# Patient Record
Sex: MALE | Race: WHITE | HISPANIC OR LATINO | URBAN - METROPOLITAN AREA
[De-identification: names, ages, dates, MRNs, and addresses within clinical notes are randomized per-mention and may not be internally consistent; named-entity substitution may affect disease eponyms.]

---

## 2022-11-09 ENCOUNTER — OFFICE (AMBULATORY)
Dept: URBAN - METROPOLITAN AREA CLINIC 76 | Facility: CLINIC | Age: 42
End: 2022-11-09
Payer: OTHER GOVERNMENT

## 2022-11-09 VITALS
HEART RATE: 61 BPM | HEIGHT: 69 IN | OXYGEN SATURATION: 99 % | SYSTOLIC BLOOD PRESSURE: 115 MMHG | DIASTOLIC BLOOD PRESSURE: 70 MMHG | WEIGHT: 220 LBS

## 2022-11-09 DIAGNOSIS — K92.1 MELENA: ICD-10-CM

## 2022-11-09 DIAGNOSIS — K64.2 THIRD DEGREE HEMORRHOIDS: ICD-10-CM

## 2022-11-09 PROCEDURE — 99204 OFFICE O/P NEW MOD 45 MIN: CPT | Performed by: INTERNAL MEDICINE

## 2022-11-23 ENCOUNTER — OFFICE (AMBULATORY)
Dept: URBAN - METROPOLITAN AREA CLINIC 76 | Facility: CLINIC | Age: 42
End: 2022-11-23
Payer: OTHER GOVERNMENT

## 2022-11-23 VITALS — HEIGHT: 69 IN

## 2022-11-23 DIAGNOSIS — K64.2 THIRD DEGREE HEMORRHOIDS: ICD-10-CM

## 2022-11-23 PROCEDURE — 46221 LIGATION OF HEMORRHOID(S): CPT | Performed by: INTERNAL MEDICINE

## 2022-11-23 NOTE — SERVICEHPINOTES
Mr. Reyes is a 42M Vázquez for dilation of internal hemorrhoids. He states that last year he began have rectal bleeding will he was living in [place]. He states was having was daily, he had a colonoscopy that was unremarkable other than internal hemorrhoids. The told to start taking fiber. He did take some Metamucil but he developed some bloating, he then began taking Benefiber which helped remarkably well. He is now based at [place], the bleeding seems to have returned. He continues to take Benefiber but still feels like he is straining at times. Occasionally depending on what he eats for dinner he will have some gas in the morning he has no upper GI symptoms. No nausea, no vomiting. No GERD, no dysphagia.No prior EGD.He is interested in hemorrhoid banding.He has no other major medical problems. He takes no blood thinners.

## 2023-01-04 ENCOUNTER — OFFICE (AMBULATORY)
Dept: URBAN - METROPOLITAN AREA CLINIC 76 | Facility: CLINIC | Age: 43
End: 2023-01-04
Payer: OTHER GOVERNMENT

## 2023-01-04 VITALS — HEIGHT: 69 IN

## 2023-01-04 DIAGNOSIS — K64.2 THIRD DEGREE HEMORRHOIDS: ICD-10-CM

## 2023-01-04 PROCEDURE — 46221 LIGATION OF HEMORRHOID(S): CPT | Performed by: INTERNAL MEDICINE

## 2023-01-04 NOTE — SERVICEHPINOTES
Mr. Reyes is a 42M Vázquez for dilation of internal hemorrhoids. He states that last year he began have rectal bleeding will he was living in [place]. He states was having was daily, he had a colonoscopy that was unremarkable other than internal hemorrhoids. The told to start taking fiber. He did take some Metamucil but he developed some bloating, he then began taking Benefiber which helped remarkably well. He is now based at [place], the bleeding seems to have returned. He continues to take Benefiber but still feels like he is straining at times. Occasionally depending on what he eats for dinner he will have some gas in the morning he has no upper GI symptoms. No nausea, no vomiting. No GERD, no dysphagia.No prior EGD.He is interested in hemorrhoid banding.He has no other major medical problems. He takes no blood thinners.

## 2023-01-11 ENCOUNTER — OFFICE (AMBULATORY)
Dept: URBAN - METROPOLITAN AREA CLINIC 76 | Facility: CLINIC | Age: 43
End: 2023-01-11
Payer: OTHER GOVERNMENT

## 2023-01-11 VITALS — HEIGHT: 69 IN

## 2023-01-11 DIAGNOSIS — K64.2 THIRD DEGREE HEMORRHOIDS: ICD-10-CM

## 2023-01-11 PROCEDURE — 46221 LIGATION OF HEMORRHOID(S): CPT | Performed by: INTERNAL MEDICINE

## 2023-04-13 ENCOUNTER — OFFICE (AMBULATORY)
Dept: URBAN - METROPOLITAN AREA CLINIC 76 | Facility: CLINIC | Age: 43
End: 2023-04-13
Payer: OTHER GOVERNMENT

## 2023-04-13 VITALS
HEIGHT: 69 IN | WEIGHT: 222 LBS | DIASTOLIC BLOOD PRESSURE: 85 MMHG | SYSTOLIC BLOOD PRESSURE: 127 MMHG | HEART RATE: 61 BPM

## 2023-04-13 DIAGNOSIS — K64.2 THIRD DEGREE HEMORRHOIDS: ICD-10-CM

## 2023-04-13 DIAGNOSIS — K62.5 HEMORRHAGE OF ANUS AND RECTUM: ICD-10-CM

## 2023-04-13 PROCEDURE — 99213 OFFICE O/P EST LOW 20 MIN: CPT | Performed by: INTERNAL MEDICINE

## 2024-04-11 VITALS
HEIGHT: 69 IN | DIASTOLIC BLOOD PRESSURE: 79 MMHG | WEIGHT: 224 LBS | SYSTOLIC BLOOD PRESSURE: 131 MMHG | RESPIRATION RATE: 16 BRPM | HEART RATE: 59 BPM

## 2024-04-12 ENCOUNTER — OFFICE (AMBULATORY)
Dept: URBAN - METROPOLITAN AREA CLINIC 76 | Facility: CLINIC | Age: 44
End: 2024-04-12
Payer: OTHER GOVERNMENT

## 2024-04-12 DIAGNOSIS — K92.1 MELENA: ICD-10-CM

## 2024-04-12 DIAGNOSIS — K64.2 THIRD DEGREE HEMORRHOIDS: ICD-10-CM

## 2024-04-12 PROCEDURE — 99214 OFFICE O/P EST MOD 30 MIN: CPT

## 2024-04-12 RX ORDER — HYDROCORTISONE ACETATE 25 MG/1
25 SUPPOSITORY RECTAL
Qty: 21 | Refills: 1 | Status: ACTIVE
Start: 2024-04-12

## 2024-07-09 ENCOUNTER — OFFICE (AMBULATORY)
Dept: URBAN - METROPOLITAN AREA CLINIC 76 | Facility: CLINIC | Age: 44
End: 2024-07-09
Payer: OTHER GOVERNMENT

## 2024-07-09 VITALS
HEART RATE: 66 BPM | WEIGHT: 223 LBS | HEIGHT: 69 IN | DIASTOLIC BLOOD PRESSURE: 76 MMHG | SYSTOLIC BLOOD PRESSURE: 128 MMHG

## 2024-07-09 DIAGNOSIS — R53.83 OTHER FATIGUE: ICD-10-CM

## 2024-07-09 DIAGNOSIS — K64.2 THIRD DEGREE HEMORRHOIDS: ICD-10-CM

## 2024-07-09 DIAGNOSIS — K92.1 MELENA: ICD-10-CM

## 2024-07-09 DIAGNOSIS — R14.0 ABDOMINAL DISTENSION (GASEOUS): ICD-10-CM

## 2024-07-09 PROCEDURE — 99214 OFFICE O/P EST MOD 30 MIN: CPT

## 2024-08-08 VITALS
RESPIRATION RATE: 17 BRPM | HEART RATE: 60 BPM | HEART RATE: 64 BPM | HEART RATE: 61 BPM | RESPIRATION RATE: 13 BRPM | DIASTOLIC BLOOD PRESSURE: 86 MMHG | HEART RATE: 58 BPM | WEIGHT: 223 LBS | HEART RATE: 73 BPM | DIASTOLIC BLOOD PRESSURE: 71 MMHG | RESPIRATION RATE: 20 BRPM | DIASTOLIC BLOOD PRESSURE: 69 MMHG | OXYGEN SATURATION: 96 % | RESPIRATION RATE: 11 BRPM | RESPIRATION RATE: 15 BRPM | DIASTOLIC BLOOD PRESSURE: 68 MMHG | DIASTOLIC BLOOD PRESSURE: 52 MMHG | SYSTOLIC BLOOD PRESSURE: 104 MMHG | TEMPERATURE: 97.2 F | OXYGEN SATURATION: 98 % | OXYGEN SATURATION: 100 % | DIASTOLIC BLOOD PRESSURE: 53 MMHG | SYSTOLIC BLOOD PRESSURE: 124 MMHG | RESPIRATION RATE: 22 BRPM | DIASTOLIC BLOOD PRESSURE: 70 MMHG | HEART RATE: 52 BPM | SYSTOLIC BLOOD PRESSURE: 141 MMHG | HEART RATE: 72 BPM | HEART RATE: 54 BPM | DIASTOLIC BLOOD PRESSURE: 56 MMHG | RESPIRATION RATE: 16 BRPM | SYSTOLIC BLOOD PRESSURE: 96 MMHG | HEIGHT: 69 IN | TEMPERATURE: 97.1 F | SYSTOLIC BLOOD PRESSURE: 101 MMHG | SYSTOLIC BLOOD PRESSURE: 107 MMHG | SYSTOLIC BLOOD PRESSURE: 111 MMHG

## 2024-08-12 ENCOUNTER — OFFICE (AMBULATORY)
Dept: URBAN - METROPOLITAN AREA PATHOLOGY 4 | Facility: PATHOLOGY | Age: 44
End: 2024-08-12
Payer: OTHER GOVERNMENT

## 2024-08-12 ENCOUNTER — AMBULATORY SURGICAL CENTER (AMBULATORY)
Dept: URBAN - METROPOLITAN AREA SURGERY 17 | Facility: SURGERY | Age: 44
End: 2024-08-12
Payer: OTHER GOVERNMENT

## 2024-08-12 DIAGNOSIS — K92.1 MELENA: ICD-10-CM

## 2024-08-12 DIAGNOSIS — D12.3 BENIGN NEOPLASM OF TRANSVERSE COLON: ICD-10-CM

## 2024-08-12 PROBLEM — K63.5 POLYP OF COLON: Status: ACTIVE | Noted: 2024-08-12

## 2024-08-12 LAB
GI HISTOLOGY: A. TRANSVERSE COLON: (no result)
GI HISTOLOGY: PDF REPORT: (no result)

## 2024-08-12 PROCEDURE — 88305 TISSUE EXAM BY PATHOLOGIST: CPT | Performed by: PATHOLOGY

## 2024-08-12 PROCEDURE — 45385 COLONOSCOPY W/LESION REMOVAL: CPT | Performed by: INTERNAL MEDICINE

## 2024-09-20 ENCOUNTER — OFFICE (AMBULATORY)
Dept: URBAN - METROPOLITAN AREA CLINIC 76 | Facility: CLINIC | Age: 44
End: 2024-09-20
Payer: OTHER GOVERNMENT

## 2024-09-20 ENCOUNTER — OFFICE (AMBULATORY)
Age: 44
End: 2024-09-20
Payer: OTHER GOVERNMENT

## 2024-09-20 VITALS
OXYGEN SATURATION: 97 % | OXYGEN SATURATION: 97 % | OXYGEN SATURATION: 97 % | SYSTOLIC BLOOD PRESSURE: 136 MMHG | HEART RATE: 65 BPM | SYSTOLIC BLOOD PRESSURE: 136 MMHG | DIASTOLIC BLOOD PRESSURE: 70 MMHG | SYSTOLIC BLOOD PRESSURE: 136 MMHG | HEART RATE: 65 BPM | HEART RATE: 65 BPM | WEIGHT: 227 LBS | DIASTOLIC BLOOD PRESSURE: 70 MMHG | WEIGHT: 227 LBS | DIASTOLIC BLOOD PRESSURE: 70 MMHG | DIASTOLIC BLOOD PRESSURE: 70 MMHG | HEIGHT: 69 IN | SYSTOLIC BLOOD PRESSURE: 136 MMHG | HEART RATE: 65 BPM | SYSTOLIC BLOOD PRESSURE: 136 MMHG | WEIGHT: 227 LBS | DIASTOLIC BLOOD PRESSURE: 70 MMHG | HEIGHT: 69 IN | HEIGHT: 69 IN | OXYGEN SATURATION: 97 % | HEART RATE: 65 BPM | HEART RATE: 65 BPM | HEIGHT: 69 IN | DIASTOLIC BLOOD PRESSURE: 70 MMHG | WEIGHT: 227 LBS | OXYGEN SATURATION: 97 % | SYSTOLIC BLOOD PRESSURE: 136 MMHG | WEIGHT: 227 LBS | OXYGEN SATURATION: 97 % | HEART RATE: 65 BPM | HEIGHT: 69 IN | SYSTOLIC BLOOD PRESSURE: 136 MMHG | OXYGEN SATURATION: 97 % | HEIGHT: 69 IN | HEIGHT: 69 IN | WEIGHT: 227 LBS | WEIGHT: 227 LBS | DIASTOLIC BLOOD PRESSURE: 70 MMHG

## 2024-09-20 DIAGNOSIS — D64.9 ANEMIA, UNSPECIFIED: ICD-10-CM

## 2024-09-20 DIAGNOSIS — K64.2 THIRD DEGREE HEMORRHOIDS: ICD-10-CM

## 2024-09-20 PROBLEM — K63.5 POLYP OF COLON: Status: ACTIVE | Noted: 2024-08-12

## 2024-09-20 PROCEDURE — 99214 OFFICE O/P EST MOD 30 MIN: CPT

## 2024-09-20 RX ORDER — PANTOPRAZOLE SODIUM 40 MG/1
40 TABLET, DELAYED RELEASE ORAL
Qty: 90 | Refills: 3 | Status: ACTIVE
Start: 2024-09-20

## 2024-10-15 VITALS
WEIGHT: 213 LBS | OXYGEN SATURATION: 96 % | DIASTOLIC BLOOD PRESSURE: 61 MMHG | WEIGHT: 213 LBS | DIASTOLIC BLOOD PRESSURE: 75 MMHG | RESPIRATION RATE: 11 BRPM | OXYGEN SATURATION: 96 % | OXYGEN SATURATION: 98 % | SYSTOLIC BLOOD PRESSURE: 121 MMHG | RESPIRATION RATE: 16 BRPM | SYSTOLIC BLOOD PRESSURE: 112 MMHG | SYSTOLIC BLOOD PRESSURE: 112 MMHG | HEIGHT: 69 IN | DIASTOLIC BLOOD PRESSURE: 71 MMHG | TEMPERATURE: 98.1 F | RESPIRATION RATE: 13 BRPM | HEART RATE: 71 BPM | RESPIRATION RATE: 5 BRPM | TEMPERATURE: 97.4 F | SYSTOLIC BLOOD PRESSURE: 121 MMHG | RESPIRATION RATE: 5 BRPM | TEMPERATURE: 98.1 F | TEMPERATURE: 98.1 F | SYSTOLIC BLOOD PRESSURE: 121 MMHG | HEART RATE: 59 BPM | DIASTOLIC BLOOD PRESSURE: 66 MMHG | RESPIRATION RATE: 7 BRPM | TEMPERATURE: 97.4 F | DIASTOLIC BLOOD PRESSURE: 87 MMHG | SYSTOLIC BLOOD PRESSURE: 123 MMHG | SYSTOLIC BLOOD PRESSURE: 101 MMHG | SYSTOLIC BLOOD PRESSURE: 121 MMHG | HEART RATE: 59 BPM | RESPIRATION RATE: 16 BRPM | SYSTOLIC BLOOD PRESSURE: 136 MMHG | SYSTOLIC BLOOD PRESSURE: 136 MMHG | DIASTOLIC BLOOD PRESSURE: 75 MMHG | HEART RATE: 61 BPM | HEART RATE: 59 BPM | DIASTOLIC BLOOD PRESSURE: 61 MMHG | DIASTOLIC BLOOD PRESSURE: 75 MMHG | TEMPERATURE: 98.1 F | SYSTOLIC BLOOD PRESSURE: 101 MMHG | RESPIRATION RATE: 11 BRPM | RESPIRATION RATE: 7 BRPM | HEART RATE: 61 BPM | HEART RATE: 61 BPM | OXYGEN SATURATION: 100 % | SYSTOLIC BLOOD PRESSURE: 112 MMHG | SYSTOLIC BLOOD PRESSURE: 136 MMHG | SYSTOLIC BLOOD PRESSURE: 112 MMHG | DIASTOLIC BLOOD PRESSURE: 99 MMHG | RESPIRATION RATE: 11 BRPM | RESPIRATION RATE: 5 BRPM | RESPIRATION RATE: 7 BRPM | DIASTOLIC BLOOD PRESSURE: 71 MMHG | RESPIRATION RATE: 11 BRPM | RESPIRATION RATE: 17 BRPM | SYSTOLIC BLOOD PRESSURE: 151 MMHG | OXYGEN SATURATION: 97 % | OXYGEN SATURATION: 96 % | DIASTOLIC BLOOD PRESSURE: 66 MMHG | OXYGEN SATURATION: 98 % | HEART RATE: 71 BPM | RESPIRATION RATE: 17 BRPM | RESPIRATION RATE: 17 BRPM | SYSTOLIC BLOOD PRESSURE: 136 MMHG | WEIGHT: 213 LBS | HEART RATE: 64 BPM | DIASTOLIC BLOOD PRESSURE: 75 MMHG | SYSTOLIC BLOOD PRESSURE: 136 MMHG | OXYGEN SATURATION: 97 % | HEIGHT: 69 IN | RESPIRATION RATE: 7 BRPM | HEART RATE: 61 BPM | DIASTOLIC BLOOD PRESSURE: 75 MMHG | HEART RATE: 64 BPM | WEIGHT: 213 LBS | DIASTOLIC BLOOD PRESSURE: 99 MMHG | HEART RATE: 71 BPM | DIASTOLIC BLOOD PRESSURE: 87 MMHG | SYSTOLIC BLOOD PRESSURE: 123 MMHG | HEART RATE: 60 BPM | TEMPERATURE: 97.4 F | HEART RATE: 61 BPM | DIASTOLIC BLOOD PRESSURE: 61 MMHG | RESPIRATION RATE: 16 BRPM | HEART RATE: 60 BPM | DIASTOLIC BLOOD PRESSURE: 71 MMHG | DIASTOLIC BLOOD PRESSURE: 66 MMHG | DIASTOLIC BLOOD PRESSURE: 87 MMHG | RESPIRATION RATE: 5 BRPM | RESPIRATION RATE: 17 BRPM | OXYGEN SATURATION: 97 % | DIASTOLIC BLOOD PRESSURE: 99 MMHG | RESPIRATION RATE: 5 BRPM | TEMPERATURE: 97.4 F | SYSTOLIC BLOOD PRESSURE: 101 MMHG | HEIGHT: 69 IN | HEIGHT: 69 IN | OXYGEN SATURATION: 100 % | SYSTOLIC BLOOD PRESSURE: 136 MMHG | SYSTOLIC BLOOD PRESSURE: 101 MMHG | RESPIRATION RATE: 13 BRPM | OXYGEN SATURATION: 98 % | SYSTOLIC BLOOD PRESSURE: 151 MMHG | RESPIRATION RATE: 11 BRPM | HEART RATE: 64 BPM | RESPIRATION RATE: 7 BRPM | OXYGEN SATURATION: 98 % | OXYGEN SATURATION: 97 % | DIASTOLIC BLOOD PRESSURE: 71 MMHG | HEART RATE: 64 BPM | SYSTOLIC BLOOD PRESSURE: 151 MMHG | SYSTOLIC BLOOD PRESSURE: 123 MMHG | OXYGEN SATURATION: 97 % | TEMPERATURE: 98.1 F | HEART RATE: 60 BPM | HEART RATE: 61 BPM | DIASTOLIC BLOOD PRESSURE: 61 MMHG | HEART RATE: 71 BPM | DIASTOLIC BLOOD PRESSURE: 75 MMHG | RESPIRATION RATE: 13 BRPM | DIASTOLIC BLOOD PRESSURE: 99 MMHG | RESPIRATION RATE: 7 BRPM | HEIGHT: 69 IN | HEART RATE: 59 BPM | HEART RATE: 60 BPM | HEART RATE: 59 BPM | TEMPERATURE: 97.4 F | SYSTOLIC BLOOD PRESSURE: 151 MMHG | DIASTOLIC BLOOD PRESSURE: 99 MMHG | TEMPERATURE: 98.1 F | DIASTOLIC BLOOD PRESSURE: 66 MMHG | DIASTOLIC BLOOD PRESSURE: 61 MMHG | SYSTOLIC BLOOD PRESSURE: 121 MMHG | HEART RATE: 60 BPM | HEART RATE: 71 BPM | DIASTOLIC BLOOD PRESSURE: 99 MMHG | RESPIRATION RATE: 17 BRPM | DIASTOLIC BLOOD PRESSURE: 87 MMHG | RESPIRATION RATE: 5 BRPM | OXYGEN SATURATION: 98 % | RESPIRATION RATE: 11 BRPM | SYSTOLIC BLOOD PRESSURE: 151 MMHG | TEMPERATURE: 97.4 F | DIASTOLIC BLOOD PRESSURE: 66 MMHG | DIASTOLIC BLOOD PRESSURE: 61 MMHG | RESPIRATION RATE: 13 BRPM | WEIGHT: 213 LBS | DIASTOLIC BLOOD PRESSURE: 99 MMHG | RESPIRATION RATE: 5 BRPM | HEART RATE: 71 BPM | RESPIRATION RATE: 13 BRPM | SYSTOLIC BLOOD PRESSURE: 123 MMHG | SYSTOLIC BLOOD PRESSURE: 101 MMHG | RESPIRATION RATE: 16 BRPM | TEMPERATURE: 98.1 F | WEIGHT: 213 LBS | OXYGEN SATURATION: 97 % | HEART RATE: 61 BPM | DIASTOLIC BLOOD PRESSURE: 87 MMHG | OXYGEN SATURATION: 97 % | SYSTOLIC BLOOD PRESSURE: 136 MMHG | RESPIRATION RATE: 7 BRPM | RESPIRATION RATE: 16 BRPM | OXYGEN SATURATION: 96 % | DIASTOLIC BLOOD PRESSURE: 61 MMHG | SYSTOLIC BLOOD PRESSURE: 112 MMHG | SYSTOLIC BLOOD PRESSURE: 112 MMHG | OXYGEN SATURATION: 100 % | SYSTOLIC BLOOD PRESSURE: 151 MMHG | SYSTOLIC BLOOD PRESSURE: 101 MMHG | HEART RATE: 60 BPM | HEART RATE: 59 BPM | HEIGHT: 69 IN | OXYGEN SATURATION: 96 % | HEART RATE: 71 BPM | SYSTOLIC BLOOD PRESSURE: 121 MMHG | DIASTOLIC BLOOD PRESSURE: 87 MMHG | SYSTOLIC BLOOD PRESSURE: 121 MMHG | TEMPERATURE: 97.4 F | HEART RATE: 64 BPM | SYSTOLIC BLOOD PRESSURE: 101 MMHG | WEIGHT: 213 LBS | SYSTOLIC BLOOD PRESSURE: 123 MMHG | OXYGEN SATURATION: 98 % | OXYGEN SATURATION: 100 % | HEIGHT: 69 IN | RESPIRATION RATE: 11 BRPM | HEART RATE: 59 BPM | SYSTOLIC BLOOD PRESSURE: 112 MMHG | RESPIRATION RATE: 16 BRPM | OXYGEN SATURATION: 96 % | SYSTOLIC BLOOD PRESSURE: 151 MMHG | DIASTOLIC BLOOD PRESSURE: 66 MMHG | OXYGEN SATURATION: 100 % | RESPIRATION RATE: 13 BRPM | HEART RATE: 64 BPM | OXYGEN SATURATION: 100 % | RESPIRATION RATE: 17 BRPM | OXYGEN SATURATION: 98 % | SYSTOLIC BLOOD PRESSURE: 123 MMHG | DIASTOLIC BLOOD PRESSURE: 71 MMHG | RESPIRATION RATE: 17 BRPM | DIASTOLIC BLOOD PRESSURE: 71 MMHG | OXYGEN SATURATION: 96 % | OXYGEN SATURATION: 100 % | DIASTOLIC BLOOD PRESSURE: 66 MMHG | RESPIRATION RATE: 16 BRPM | DIASTOLIC BLOOD PRESSURE: 75 MMHG | HEART RATE: 64 BPM | HEART RATE: 60 BPM | DIASTOLIC BLOOD PRESSURE: 71 MMHG | RESPIRATION RATE: 13 BRPM | DIASTOLIC BLOOD PRESSURE: 87 MMHG | SYSTOLIC BLOOD PRESSURE: 123 MMHG

## 2024-10-18 ENCOUNTER — AMBULATORY SURGICAL CENTER (AMBULATORY)
Age: 44
End: 2024-10-18
Payer: OTHER GOVERNMENT

## 2024-10-18 ENCOUNTER — OFFICE (AMBULATORY)
Age: 44
End: 2024-10-18
Payer: OTHER GOVERNMENT

## 2024-10-18 ENCOUNTER — OFFICE (AMBULATORY)
Dept: URBAN - METROPOLITAN AREA PATHOLOGY 4 | Facility: PATHOLOGY | Age: 44
End: 2024-10-18
Payer: OTHER GOVERNMENT

## 2024-10-18 ENCOUNTER — AMBULATORY SURGICAL CENTER (AMBULATORY)
Dept: URBAN - METROPOLITAN AREA SURGERY 17 | Facility: SURGERY | Age: 44
End: 2024-10-18
Payer: OTHER GOVERNMENT

## 2024-10-18 DIAGNOSIS — R53.83 OTHER FATIGUE: ICD-10-CM

## 2024-10-18 DIAGNOSIS — K31.89 OTHER DISEASES OF STOMACH AND DUODENUM: ICD-10-CM

## 2024-10-18 DIAGNOSIS — D64.9 ANEMIA, UNSPECIFIED: ICD-10-CM

## 2024-10-18 DIAGNOSIS — R10.13 EPIGASTRIC PAIN: ICD-10-CM

## 2024-10-18 LAB
GI HISTOLOGY: A. SECOND PART OF THE DUODENUM: (no result)
GI HISTOLOGY: B. DUODENAL BULB: (no result)
GI HISTOLOGY: C. STOMACH ANTRUM: (no result)
GI HISTOLOGY: D. LOWER THIRD OF THE ESOPHAGUS: (no result)
GI HISTOLOGY: E. MIDDLE THIRD OF THE ESOPHAGUS: (no result)
GI HISTOLOGY: F. UPPER THIRD OF THE ESOPHAGUS: (no result)
GI HISTOLOGY: PDF REPORT: (no result)

## 2024-10-18 PROCEDURE — 88305 TISSUE EXAM BY PATHOLOGIST: CPT | Performed by: PATHOLOGY

## 2024-10-18 PROCEDURE — 88342 IMHCHEM/IMCYTCHM 1ST ANTB: CPT | Performed by: PATHOLOGY

## 2024-10-18 PROCEDURE — 43235 EGD DIAGNOSTIC BRUSH WASH: CPT | Performed by: INTERNAL MEDICINE

## 2025-01-18 ENCOUNTER — HOSPITAL ENCOUNTER (EMERGENCY)
Facility: HOSPITAL | Age: 45
Discharge: HOME | End: 2025-01-18
Attending: EMERGENCY MEDICINE
Payer: OTHER GOVERNMENT

## 2025-01-18 VITALS
TEMPERATURE: 98 F | OXYGEN SATURATION: 100 % | DIASTOLIC BLOOD PRESSURE: 88 MMHG | RESPIRATION RATE: 20 BRPM | HEART RATE: 78 BPM | SYSTOLIC BLOOD PRESSURE: 128 MMHG

## 2025-01-18 DIAGNOSIS — K52.9 ACUTE GASTROENTERITIS: Primary | ICD-10-CM

## 2025-01-18 DIAGNOSIS — R55 SYNCOPE, UNSPECIFIED SYNCOPE TYPE: ICD-10-CM

## 2025-01-18 LAB
ALBUMIN SERPL-MCNC: 4 G/DL (ref 3.5–5.2)
ALBUMIN/GLOB SERPL: 1.1 G/DL
ALP SERPL-CCNC: 37 U/L (ref 39–117)
ALT SERPL W P-5'-P-CCNC: 14 U/L (ref 1–41)
ANION GAP SERPL CALCULATED.3IONS-SCNC: 11 MMOL/L (ref 5–15)
AST SERPL-CCNC: 19 U/L (ref 1–40)
B PARAPERT DNA SPEC QL NAA+PROBE: NOT DETECTED
B PERT DNA SPEC QL NAA+PROBE: NOT DETECTED
BASOPHILS # BLD AUTO: 0.02 10*3/MM3 (ref 0–0.2)
BASOPHILS NFR BLD AUTO: 0.2 % (ref 0–1.5)
BILIRUB SERPL-MCNC: 0.5 MG/DL (ref 0–1.2)
BUN SERPL-MCNC: 9 MG/DL (ref 6–20)
BUN/CREAT SERPL: 7.5 (ref 7–25)
C PNEUM DNA NPH QL NAA+NON-PROBE: NOT DETECTED
CALCIUM SPEC-SCNC: 9 MG/DL (ref 8.6–10.5)
CHLORIDE SERPL-SCNC: 101 MMOL/L (ref 98–107)
CO2 SERPL-SCNC: 25 MMOL/L (ref 22–29)
CREAT SERPL-MCNC: 1.2 MG/DL (ref 0.76–1.27)
DEPRECATED RDW RBC AUTO: 40.9 FL (ref 37–54)
EGFRCR SERPLBLD CKD-EPI 2021: 77.4 ML/MIN/1.73
EOSINOPHIL # BLD AUTO: 0.08 10*3/MM3 (ref 0–0.4)
EOSINOPHIL NFR BLD AUTO: 0.9 % (ref 0.3–6.2)
ERYTHROCYTE [DISTWIDTH] IN BLOOD BY AUTOMATED COUNT: 12.7 % (ref 12.3–15.4)
FLUAV SUBTYP SPEC NAA+PROBE: NOT DETECTED
FLUBV RNA ISLT QL NAA+PROBE: NOT DETECTED
GEN 5 1HR TROPONIN T REFLEX: <6 NG/L
GLOBULIN UR ELPH-MCNC: 3.5 GM/DL
GLUCOSE SERPL-MCNC: 143 MG/DL (ref 65–99)
HADV DNA SPEC NAA+PROBE: NOT DETECTED
HCOV 229E RNA SPEC QL NAA+PROBE: NOT DETECTED
HCOV HKU1 RNA SPEC QL NAA+PROBE: NOT DETECTED
HCOV NL63 RNA SPEC QL NAA+PROBE: NOT DETECTED
HCOV OC43 RNA SPEC QL NAA+PROBE: NOT DETECTED
HCT VFR BLD AUTO: 42.3 % (ref 37.5–51)
HGB BLD-MCNC: 14 G/DL (ref 13–17.7)
HMPV RNA NPH QL NAA+NON-PROBE: NOT DETECTED
HOLD SPECIMEN: NORMAL
HOLD SPECIMEN: NORMAL
HPIV1 RNA ISLT QL NAA+PROBE: NOT DETECTED
HPIV2 RNA SPEC QL NAA+PROBE: NOT DETECTED
HPIV3 RNA NPH QL NAA+PROBE: NOT DETECTED
HPIV4 P GENE NPH QL NAA+PROBE: NOT DETECTED
IMM GRANULOCYTES # BLD AUTO: 0.04 10*3/MM3 (ref 0–0.05)
IMM GRANULOCYTES NFR BLD AUTO: 0.5 % (ref 0–0.5)
LYMPHOCYTES # BLD AUTO: 0.48 10*3/MM3 (ref 0.7–3.1)
LYMPHOCYTES NFR BLD AUTO: 5.6 % (ref 19.6–45.3)
M PNEUMO IGG SER IA-ACNC: NOT DETECTED
MAGNESIUM SERPL-MCNC: 1.7 MG/DL (ref 1.6–2.6)
MCH RBC QN AUTO: 29.1 PG (ref 26.6–33)
MCHC RBC AUTO-ENTMCNC: 33.1 G/DL (ref 31.5–35.7)
MCV RBC AUTO: 87.9 FL (ref 79–97)
MONOCYTES # BLD AUTO: 0.48 10*3/MM3 (ref 0.1–0.9)
MONOCYTES NFR BLD AUTO: 5.6 % (ref 5–12)
NEUTROPHILS NFR BLD AUTO: 7.42 10*3/MM3 (ref 1.7–7)
NEUTROPHILS NFR BLD AUTO: 87.2 % (ref 42.7–76)
NRBC BLD AUTO-RTO: 0 /100 WBC (ref 0–0.2)
PLATELET # BLD AUTO: 260 10*3/MM3 (ref 140–450)
PMV BLD AUTO: 9.4 FL (ref 6–12)
POTASSIUM SERPL-SCNC: 3.2 MMOL/L (ref 3.5–5.2)
PROT SERPL-MCNC: 7.5 G/DL (ref 6–8.5)
QT INTERVAL: 360 MS
QTC INTERVAL: 424 MS
RBC # BLD AUTO: 4.81 10*6/MM3 (ref 4.14–5.8)
RHINOVIRUS RNA SPEC NAA+PROBE: NOT DETECTED
RSV RNA NPH QL NAA+NON-PROBE: NOT DETECTED
SARS-COV-2 RNA RESP QL NAA+PROBE: NOT DETECTED
SODIUM SERPL-SCNC: 137 MMOL/L (ref 136–145)
TROPONIN T NUMERIC DELTA: NORMAL
TROPONIN T SERPL HS-MCNC: <6 NG/L
WBC NRBC COR # BLD AUTO: 8.52 10*3/MM3 (ref 3.4–10.8)
WHOLE BLOOD HOLD COAG: NORMAL
WHOLE BLOOD HOLD SPECIMEN: NORMAL

## 2025-01-18 PROCEDURE — 85025 COMPLETE CBC W/AUTO DIFF WBC: CPT | Performed by: EMERGENCY MEDICINE

## 2025-01-18 PROCEDURE — 80053 COMPREHEN METABOLIC PANEL: CPT

## 2025-01-18 PROCEDURE — 25810000003 SODIUM CHLORIDE 0.9 % SOLUTION: Performed by: EMERGENCY MEDICINE

## 2025-01-18 PROCEDURE — 93005 ELECTROCARDIOGRAM TRACING: CPT | Performed by: EMERGENCY MEDICINE

## 2025-01-18 PROCEDURE — 93005 ELECTROCARDIOGRAM TRACING: CPT

## 2025-01-18 PROCEDURE — 83735 ASSAY OF MAGNESIUM: CPT

## 2025-01-18 PROCEDURE — 84484 ASSAY OF TROPONIN QUANT: CPT

## 2025-01-18 PROCEDURE — 99283 EMERGENCY DEPT VISIT LOW MDM: CPT

## 2025-01-18 PROCEDURE — 96374 THER/PROPH/DIAG INJ IV PUSH: CPT

## 2025-01-18 PROCEDURE — 36415 COLL VENOUS BLD VENIPUNCTURE: CPT | Performed by: EMERGENCY MEDICINE

## 2025-01-18 PROCEDURE — 0202U NFCT DS 22 TRGT SARS-COV-2: CPT | Performed by: EMERGENCY MEDICINE

## 2025-01-18 PROCEDURE — 25010000002 ONDANSETRON PER 1 MG: Performed by: EMERGENCY MEDICINE

## 2025-01-18 RX ORDER — SODIUM CHLORIDE 0.9 % (FLUSH) 0.9 %
10 SYRINGE (ML) INJECTION AS NEEDED
Status: DISCONTINUED | OUTPATIENT
Start: 2025-01-18 | End: 2025-01-18 | Stop reason: HOSPADM

## 2025-01-18 RX ORDER — DICYCLOMINE HCL 20 MG
20 TABLET ORAL EVERY 8 HOURS PRN
Qty: 12 TABLET | Refills: 0 | Status: SHIPPED | OUTPATIENT
Start: 2025-01-18

## 2025-01-18 RX ORDER — ONDANSETRON 2 MG/ML
4 INJECTION INTRAMUSCULAR; INTRAVENOUS ONCE
Status: COMPLETED | OUTPATIENT
Start: 2025-01-18 | End: 2025-01-18

## 2025-01-18 RX ORDER — DICYCLOMINE HYDROCHLORIDE 10 MG/1
20 CAPSULE ORAL ONCE
Status: COMPLETED | OUTPATIENT
Start: 2025-01-18 | End: 2025-01-18

## 2025-01-18 RX ORDER — ONDANSETRON 4 MG/1
4 TABLET, ORALLY DISINTEGRATING ORAL 4 TIMES DAILY PRN
Qty: 10 TABLET | Refills: 0 | Status: SHIPPED | OUTPATIENT
Start: 2025-01-18

## 2025-01-18 RX ADMIN — DICYCLOMINE HYDROCHLORIDE 20 MG: 10 CAPSULE ORAL at 03:47

## 2025-01-18 RX ADMIN — ONDANSETRON 4 MG: 2 INJECTION INTRAMUSCULAR; INTRAVENOUS at 03:46

## 2025-01-18 RX ADMIN — SODIUM CHLORIDE 1000 ML: 9 INJECTION, SOLUTION INTRAVENOUS at 03:49

## 2025-01-18 NOTE — ED PROVIDER NOTES
Time: 2:41 AM EST  Date of encounter:  1/18/2025  Independent Historian/Clinical History and Information was obtained by:   Patient and Family    History is limited by: N/A    Chief Complaint: Syncope      History of Present Illness:  Patient is a 44 y.o. year old male who presents to the emergency department for evaluation of syncope    Patient states that he has been dealing with abdominal discomfort for the past 1 to 2 days and ultimately took prune juice and over-the-counter laxatives earlier today did attempt to have bowel movements.  States he subsequently has had several bowel movements but his stomach has remained very unsettled.  He has had episodes where he felt extremely nauseous but has had no vomiting.  He began to feel flushed and uncomfortable earlier stood up and went outside to cool off when he ultimately collapsed losing consciousness.  His family heard his fall and ultimately found him and called EMS.  Patient quickly resumed consciousness and denies any injury.  He states his stomach remains very unsettled and he feels nauseous intermittently flushed with chills.      Patient Care Team  Primary Care Provider: Provider, No Known    Past Medical History:     No Known Allergies  No past medical history on file.  No past surgical history on file.  No family history on file.    Home Medications:  Prior to Admission medications    Not on File        Social History:          Review of Systems:  Review of Systems   Constitutional:  Positive for chills, diaphoresis, fatigue and fever.   HENT:  Negative for congestion, ear pain and sore throat.    Eyes:  Negative for pain.   Respiratory:  Negative for cough, chest tightness and shortness of breath.    Cardiovascular:  Negative for chest pain.   Gastrointestinal:  Positive for abdominal pain, constipation, diarrhea, nausea and vomiting.   Genitourinary:  Negative for flank pain and hematuria.   Musculoskeletal:  Negative for joint swelling.   Skin:   Negative for pallor.   Neurological:  Negative for seizures and headaches.   All other systems reviewed and are negative.       Physical Exam:  /88 (BP Location: Right arm, Patient Position: Lying)   Pulse 78   Temp 98 °F (36.7 °C) (Oral)   Resp 20   SpO2 100%     Physical Exam  Vitals and nursing note reviewed.   Constitutional:       General: He is not in acute distress.     Appearance: Normal appearance. He is not toxic-appearing.   HENT:      Head: Normocephalic and atraumatic.      Jaw: There is normal jaw occlusion.   Eyes:      General: Lids are normal.      Extraocular Movements: Extraocular movements intact.      Conjunctiva/sclera: Conjunctivae normal.      Pupils: Pupils are equal, round, and reactive to light.   Cardiovascular:      Rate and Rhythm: Normal rate and regular rhythm.      Pulses: Normal pulses.      Heart sounds: Normal heart sounds.   Pulmonary:      Effort: Pulmonary effort is normal. No respiratory distress.      Breath sounds: Normal breath sounds. No wheezing or rhonchi.   Abdominal:      General: Abdomen is flat.      Palpations: Abdomen is soft.      Tenderness: There is no abdominal tenderness. There is no guarding or rebound.   Musculoskeletal:         General: Normal range of motion.      Cervical back: Normal range of motion and neck supple.      Right lower leg: No edema.      Left lower leg: No edema.   Skin:     General: Skin is warm and dry.   Neurological:      Mental Status: He is alert and oriented to person, place, and time. Mental status is at baseline.   Psychiatric:         Mood and Affect: Mood normal.              Medical Decision Making:      Comorbidities that affect care:    Possible autoimmune disorder    External Notes reviewed:    None      The following orders were placed and all results were independently analyzed by me:  Orders Placed This Encounter   Procedures    Respiratory Panel PCR w/COVID-19(SARS-CoV-2) NAWAF/PHILIPP/ANNETTE/PAD/COR/LAKESHA In-House, NP  Swab in UTM/VTM, 2 HR TAT - Swab, Nasopharynx    [place] Draw    Comprehensive Metabolic Panel    Magnesium    High Sensitivity Troponin T    CBC Auto Differential    High Sensitivity Troponin T 1Hr    NPO Diet NPO Type: Strict NPO    Undress & Gown    Continuous Pulse Oximetry    Vital Signs    Orthostatic Blood Pressure    Oxygen Therapy- Nasal Cannula; Titrate 1-6 LPM Per SpO2; 90 - 95%    POC Glucose Once    ECG 12 Lead ED Triage Standing Order; Syncope    Insert Peripheral IV    Insert Peripheral IV    CBC & Differential    Green Top (Gel)    Lavender Top    Gold Top - SST    Light Blue Top       Medications Given in the Emergency Department:  Medications   sodium chloride 0.9 % flush 10 mL (has no administration in time range)   sodium chloride 0.9 % flush 10 mL (has no administration in time range)   sodium chloride 0.9 % bolus 1,000 mL (0 mL Intravenous Stopped 1/18/25 0514)   ondansetron (ZOFRAN) injection 4 mg (4 mg Intravenous Given 1/18/25 0346)   dicyclomine (BENTYL) capsule 20 mg (20 mg Oral Given 1/18/25 0347)        ED Course:    ED Course as of 01/18/25 0637   Sat Jan 18, 2025   0243 My interpretation of EKG: Sinus rhythm 83, no acute ischemia, normal intervals [JS]      ED Course User Index  [JS] Victor Manuel Garcia MD       Labs:    Lab Results (last 24 hours)       Procedure Component Value Units Date/Time    CBC & Differential [[id number]]  (Abnormal) Collected: 01/18/25 0022    Specimen: Blood from Arm, Right Updated: 01/18/25 0035    Narrative:      The following orders were created for panel order CBC & Differential.  Procedure                               Abnormality         Status                     ---------                               -----------         ------                     CBC Auto Differential[[id number]]        Abnormal            Final result                 Please view results for these tests on the individual orders.    Comprehensive Metabolic Panel [[id number]]  (Abnormal)  Collected: 01/18/25 0022    Specimen: Blood from Arm, Right Updated: 01/18/25 0056     Glucose 143 mg/dL      BUN 9 mg/dL      Creatinine 1.20 mg/dL      Sodium 137 mmol/L      Potassium 3.2 mmol/L      Chloride 101 mmol/L      CO2 25.0 mmol/L      Calcium 9.0 mg/dL      Total Protein 7.5 g/dL      Albumin 4.0 g/dL      ALT (SGPT) 14 U/L      AST (SGOT) 19 U/L      Alkaline Phosphatase 37 U/L      Total Bilirubin 0.5 mg/dL      Globulin 3.5 gm/dL      A/G Ratio 1.1 g/dL      BUN/Creatinine Ratio 7.5     Anion Gap 11.0 mmol/L      eGFR 77.4 mL/min/1.73     Narrative:      GFR Categories in Chronic Kidney Disease (CKD)      GFR Category          GFR (mL/min/1.73)    Interpretation  G1                     90 or greater         Normal or high (1)  G2                      60-89                Mild decrease (1)  G3a                   45-59                Mild to moderate decrease  G3b                   30-44                Moderate to severe decrease  G4                    15-29                Severe decrease  G5                    14 or less           Kidney failure          (1)In the absence of evidence of kidney disease, neither GFR category G1 or G2 fulfill the criteria for CKD.    eGFR calculation 2021 CKD-EPI creatinine equation, which does not include race as a factor    Magnesium [[id number]]  (Normal) Collected: 01/18/25 0022    Specimen: Blood from Arm, Right Updated: 01/18/25 0056     Magnesium 1.7 mg/dL     High Sensitivity Troponin T [[id number]]  (Normal) Collected: 01/18/25 0022    Specimen: Blood from Arm, Right Updated: 01/18/25 0056     HS Troponin T <6 ng/L     Narrative:      High Sensitive Troponin T Reference Range:  <14.0 ng/L- Negative Female for AMI  <22.0 ng/L- Negative Male for AMI  >=14 - Abnormal Female indicating possible myocardial injury.  >=22 - Abnormal Male indicating possible myocardial injury.   Clinicians would have to utilize clinical acumen, EKG, Troponin, and serial changes to  determine if it is an Acute Myocardial Infarction or myocardial injury due to an underlying chronic condition.         CBC Auto Differential [[id number]]  (Abnormal) Collected: 01/18/25 0022    Specimen: Blood from Arm, Right Updated: 01/18/25 0035     WBC 8.52 10*3/mm3      RBC 4.81 10*6/mm3      Hemoglobin 14.0 g/dL      Hematocrit 42.3 %      MCV 87.9 fL      MCH 29.1 pg      MCHC 33.1 g/dL      RDW 12.7 %      RDW-SD 40.9 fl      MPV 9.4 fL      Platelets 260 10*3/mm3      Neutrophil % 87.2 %      Lymphocyte % 5.6 %      Monocyte % 5.6 %      Eosinophil % 0.9 %      Basophil % 0.2 %      Immature Grans % 0.5 %      Neutrophils, Absolute 7.42 10*3/mm3      Lymphocytes, Absolute 0.48 10*3/mm3      Monocytes, Absolute 0.48 10*3/mm3      Eosinophils, Absolute 0.08 10*3/mm3      Basophils, Absolute 0.02 10*3/mm3      Immature Grans, Absolute 0.04 10*3/mm3      nRBC 0.0 /100 WBC     High Sensitivity Troponin T 1Hr [[id number]] Collected: 01/18/25 0128    Specimen: Blood from Arm, Right Updated: 01/18/25 0154     HS Troponin T <6 ng/L      Troponin T Numeric Delta --     Comment: Unable to calculate.       Narrative:      High Sensitive Troponin T Reference Range:  <14.0 ng/L- Negative Female for AMI  <22.0 ng/L- Negative Male for AMI  >=14 - Abnormal Female indicating possible myocardial injury.  >=22 - Abnormal Male indicating possible myocardial injury.   Clinicians would have to utilize clinical acumen, EKG, Troponin, and serial changes to determine if it is an Acute Myocardial Infarction or myocardial injury due to an underlying chronic condition.         Respiratory Panel PCR w/COVID-19(SARS-CoV-2) NAWAF/PHILIPP/ANNETTE/PAD/COR/LAKESHA In-House, NP Swab in [facility], 2 HR TAT - Swab, Nasopharynx [[id number]]  (Normal) Collected: 01/18/25 0349    Specimen: Swab from Nasopharynx Updated: 01/18/25 2022     ADENOVIRUS, PCR Not Detected     Coronavirus 229E Not Detected     Coronavirus HKU1 Not Detected     Coronavirus NL63 Not Detected      Coronavirus OC43 Not Detected     COVID19 Not Detected     Human Metapneumovirus Not Detected     Human Rhinovirus/Enterovirus Not Detected     Influenza A PCR Not Detected     Influenza B PCR Not Detected     Parainfluenza Virus 1 Not Detected     Parainfluenza Virus 2 Not Detected     Parainfluenza Virus 3 Not Detected     Parainfluenza Virus 4 Not Detected     RSV, PCR Not Detected     Bordetella pertussis pcr Not Detected     Bordetella parapertussis PCR Not Detected     Chlamydophila pneumoniae PCR Not Detected     Mycoplasma pneumo by PCR Not Detected    Narrative:      In the setting of a positive respiratory panel with a viral infection PLUS a negative procalcitonin without other underlying concern for bacterial infection, consider observing off antibiotics or discontinuation of antibiotics and continue supportive care. If the respiratory panel is positive for atypical bacterial infection (Bordetella pertussis, Chlamydophila pneumoniae, or Mycoplasma pneumoniae), consider antibiotic de-escalation to target atypical bacterial infection.             Imaging:    No Radiology Exams Resulted Within Past 24 Hours      Differential Diagnosis and Discussion:    Syncope: Differential diagnosis includes but is not limited to TIA, hyperventilation, aortic stenosis, pulmonary emboli, myocardial disease, bradycardia arrhythmia, heart block, tachyarrhythmia, vasovagal, orthostatic hypotension, ruptured AAA, aortic dissection, subarachnoid hemorrhage, seizure, hypoglycemia.    PROCEDURES:    Labs were collected in the emergency department and all labs were reviewed and interpreted by me.  An EKG was performed and the EKG was interpreted by me.    ECG 12 Lead ED Triage Standing Order; Syncope   Preliminary Result   HEART RATE=83  bpm   RR Jqqrglak=544  ms   OK Jisiwjrq=001  ms   P Horizontal Axis=-3  deg   P Front Axis=49  deg   QRSD Sbpvicgt=866  ms   QT Daistgrn=875  ms   AJiL=877  ms   QRS Axis=20  deg   T Wave  Axis=40  deg   - ABNORMAL ECG -   Sinus rhythm   Prolonged OR interval   Date and Time of Study:2025-01-18 00:17:17          Procedures    MDM                     Patient Care Considerations:    NARCOTICS: I considered prescribing opiate pain medication as an outpatient, however no pain control required in the emergency department.      Consultants/Shared Management Plan:    None    Social Determinants of Health:    Patient has presented with family members who are responsible, reliable and will ensure follow up care.      Disposition and Care Coordination:    Discharged: I considered escalation of care by admitting this patient to the hospital, however patient has benign workup and has no further syncopal episodes while in the emergency department.    I have explained the patient´s condition, diagnoses and treatment plan based on the information available to me at this time. I have answered questions and addressed any concerns. The patient has a good  understanding of the patient´s diagnosis, condition, and treatment plan as can be expected at this point. The vital signs have been stable. The patient´s condition is stable and appropriate for discharge from the emergency department.      The patient will pursue further outpatient evaluation with the primary care physician or other designated or consulting physician as outlined in the discharge instructions. They are agreeable to this plan of care and follow-up instructions have been explained in detail. The patient has received these instructions in written format and has expressed an understanding of the discharge instructions. The patient is aware that any significant change in condition or worsening of symptoms should prompt an immediate return to this or the closest emergency department or call to 911.  I have explained discharge medications and the need for follow up with the patient/caretakers. This was also printed in the discharge instructions. Patient was  discharged with the following medications and follow up:      Medication List        New Prescriptions      dicyclomine 20 MG tablet  Commonly known as: BENTYL  Take 1 tablet by mouth Every 8 (Eight) Hours As Needed for Abdominal Cramping.     ondansetron ODT 4 MG disintegrating tablet  Commonly known as: ZOFRAN-ODT  Place 1 tablet on the tongue 4 (Four) Times a Day As Needed for Nausea.               Where to Get Your Medications        These medications were sent to [facility]/pharmacy #[id number] - [place], [place] - [address] - [phone] [facility] [phone]   [address] [place] [address]      Hours: 24-hours Phone: [phone]   dicyclomine 20 MG tablet  ondansetron ODT 4 MG disintegrating tablet      Provider, No Known  [facility]  [place] [address]    Schedule an appointment as soon as possible for a visit          Final diagnoses:   Acute gastroenteritis   Syncope, unspecified syncope type        ED Disposition       ED Disposition   Discharge    Condition   Stable    Comment   --               This medical record created using voice recognition software.             Victor Manuel Garcia MD  01/18/25 0637

## 2025-02-11 LAB
QT INTERVAL: 360 MS
QTC INTERVAL: 424 MS

## 2025-02-27 ENCOUNTER — HOSPITAL ENCOUNTER (EMERGENCY)
Facility: HOSPITAL | Age: 45
Discharge: HOME | End: 2025-02-27
Attending: EMERGENCY MEDICINE
Payer: OTHER GOVERNMENT

## 2025-02-27 VITALS
OXYGEN SATURATION: 95 % | DIASTOLIC BLOOD PRESSURE: 88 MMHG | SYSTOLIC BLOOD PRESSURE: 136 MMHG | HEART RATE: 66 BPM | TEMPERATURE: 98.9 F | RESPIRATION RATE: 16 BRPM | WEIGHT: 217.59 LBS

## 2025-02-27 DIAGNOSIS — K64.9 HEMORRHOIDS, UNSPECIFIED HEMORRHOID TYPE: Primary | ICD-10-CM

## 2025-02-27 PROCEDURE — 96374 THER/PROPH/DIAG INJ IV PUSH: CPT

## 2025-02-27 PROCEDURE — 25010000002 KETOROLAC TROMETHAMINE PER 15 MG

## 2025-02-27 PROCEDURE — 99283 EMERGENCY DEPT VISIT LOW MDM: CPT

## 2025-02-27 RX ORDER — LIDOCAINE 50 MG/G
1 OINTMENT TOPICAL
Qty: 1 EACH | Refills: 0 | Status: SHIPPED | OUTPATIENT
Start: 2025-02-27 | End: 2025-03-02

## 2025-02-27 RX ORDER — DOCUSATE SODIUM 100 MG/1
100 CAPSULE, LIQUID FILLED ORAL ONCE
Status: COMPLETED | OUTPATIENT
Start: 2025-02-27 | End: 2025-02-27

## 2025-02-27 RX ORDER — DOCUSATE SODIUM 100 MG/1
100 CAPSULE, LIQUID FILLED ORAL 2 TIMES DAILY
Qty: 10 CAPSULE | Refills: 0 | Status: SHIPPED | OUTPATIENT
Start: 2025-02-27 | End: 2025-03-04

## 2025-02-27 RX ORDER — KETOROLAC TROMETHAMINE 30 MG/ML
30 INJECTION, SOLUTION INTRAMUSCULAR; INTRAVENOUS ONCE AS NEEDED
Status: DISCONTINUED | OUTPATIENT
Start: 2025-02-27 | End: 2025-02-27

## 2025-02-27 RX ORDER — IBUPROFEN 800 MG/1
800 TABLET, FILM COATED ORAL EVERY 8 HOURS PRN
Qty: 15 TABLET | Refills: 0 | Status: SHIPPED | OUTPATIENT
Start: 2025-02-27 | End: 2025-03-04

## 2025-02-27 RX ORDER — SODIUM CHLORIDE 0.9 % (FLUSH) 0.9 %
10 SYRINGE (ML) INJECTION AS NEEDED
Status: DISCONTINUED | OUTPATIENT
Start: 2025-02-27 | End: 2025-02-27 | Stop reason: HOSPADM

## 2025-02-27 RX ORDER — LIDOCAINE HYDROCHLORIDE 20 MG/ML
JELLY TOPICAL ONCE
Status: COMPLETED | OUTPATIENT
Start: 2025-02-27 | End: 2025-02-27

## 2025-02-27 RX ORDER — KETOROLAC TROMETHAMINE 30 MG/ML
30 INJECTION, SOLUTION INTRAMUSCULAR; INTRAVENOUS ONCE AS NEEDED
Status: DISCONTINUED | OUTPATIENT
Start: 2025-02-27 | End: 2025-02-27 | Stop reason: HOSPADM

## 2025-02-27 RX ADMIN — KETOROLAC TROMETHAMINE 30 MG: 30 INJECTION, SOLUTION INTRAMUSCULAR; INTRAVENOUS at 03:44

## 2025-02-27 RX ADMIN — LIDOCAINE HYDROCHLORIDE: 20 JELLY TOPICAL at 03:37

## 2025-02-27 RX ADMIN — DOCUSATE SODIUM 100 MG: 100 CAPSULE, LIQUID FILLED ORAL at 03:54

## 2025-02-27 NOTE — ED PROVIDER NOTES
SHARED VISIT NOTE:    Patient is 44 y.o. year old male that presents to the ED for evaluation of hemorrhoid pain.     Physical Exam    ED Course:    /88 (Patient Position: Lying)   Pulse 66   Temp 98.9 °F (37.2 °C)   Resp 16   Wt 98.7 kg (217 lb 9.5 oz)   SpO2 95%   Results for orders placed or performed during the hospital encounter of 01/18/25   ECG 12 Lead ED Triage Standing Order; Syncope    Collection Time: 01/18/25 12:17 AM   Result Value Ref Range    QT Interval 360 ms    QTC Interval 424 ms   Comprehensive Metabolic Panel    Collection Time: 01/18/25 12:22 AM    Specimen: Arm, Right; Blood   Result Value Ref Range    Glucose 143 (H) 65 - 99 mg/dL    BUN 9 6 - 20 mg/dL    Creatinine 1.20 0.76 - 1.27 mg/dL    Sodium 137 136 - 145 mmol/L    Potassium 3.2 (L) 3.5 - 5.2 mmol/L    Chloride 101 98 - 107 mmol/L    CO2 25.0 22.0 - 29.0 mmol/L    Calcium 9.0 8.6 - 10.5 mg/dL    Total Protein 7.5 6.0 - 8.5 g/dL    Albumin 4.0 3.5 - 5.2 g/dL    ALT (SGPT) 14 1 - 41 U/L    AST (SGOT) 19 1 - 40 U/L    Alkaline Phosphatase 37 (L) 39 - 117 U/L    Total Bilirubin 0.5 0.0 - 1.2 mg/dL    Globulin 3.5 gm/dL    A/G Ratio 1.1 g/dL    BUN/Creatinine Ratio 7.5 7.0 - 25.0    Anion Gap 11.0 5.0 - 15.0 mmol/L    eGFR 77.4 >60.0 mL/min/1.73   Magnesium    Collection Time: 01/18/25 12:22 AM    Specimen: Arm, Right; Blood   Result Value Ref Range    Magnesium 1.7 1.6 - 2.6 mg/dL   High Sensitivity Troponin T    Collection Time: 01/18/25 12:22 AM    Specimen: Arm, Right; Blood   Result Value Ref Range    HS Troponin T <6 <22 ng/L   CBC Auto Differential    Collection Time: 01/18/25 12:22 AM    Specimen: Arm, Right; Blood   Result Value Ref Range    WBC 8.52 3.40 - 10.80 10*3/mm3    RBC 4.81 4.14 - 5.80 10*6/mm3    Hemoglobin 14.0 13.0 - 17.7 g/dL    Hematocrit 42.3 37.5 - 51.0 %    MCV 87.9 79.0 - 97.0 fL    MCH 29.1 26.6 - 33.0 pg    MCHC 33.1 31.5 - 35.7 g/dL    RDW 12.7 12.3 - 15.4 %    RDW-SD 40.9 37.0 - 54.0 fl    MPV 9.4  6.0 - 12.0 fL    Platelets 260 140 - 450 10*3/mm3    Neutrophil % 87.2 (H) 42.7 - 76.0 %    Lymphocyte % 5.6 (L) 19.6 - 45.3 %    Monocyte % 5.6 5.0 - 12.0 %    Eosinophil % 0.9 0.3 - 6.2 %    Basophil % 0.2 0.0 - 1.5 %    Immature Grans % 0.5 0.0 - 0.5 %    Neutrophils, Absolute 7.42 (H) 1.70 - 7.00 10*3/mm3    Lymphocytes, Absolute 0.48 (L) 0.70 - 3.10 10*3/mm3    Monocytes, Absolute 0.48 0.10 - 0.90 10*3/mm3    Eosinophils, Absolute 0.08 0.00 - 0.40 10*3/mm3    Basophils, Absolute 0.02 0.00 - 0.20 10*3/mm3    Immature Grans, Absolute 0.04 0.00 - 0.05 10*3/mm3    nRBC 0.0 0.0 - 0.2 /100 WBC   Green Top (Gel)    Collection Time: 01/18/25 12:22 AM   Result Value Ref Range    Extra Tube Hold for add-ons.    Lavender Top    Collection Time: 01/18/25 12:22 AM   Result Value Ref Range    Extra Tube hold for add-on    Gold Top - SST    Collection Time: 01/18/25 12:22 AM   Result Value Ref Range    Extra Tube Hold for add-ons.    Light Blue Top    Collection Time: 01/18/25 12:22 AM   Result Value Ref Range    Extra Tube Hold for add-ons.    High Sensitivity Troponin T 1Hr    Collection Time: 01/18/25  1:28 AM    Specimen: Arm, Right; Blood   Result Value Ref Range    HS Troponin T <6 <22 ng/L    Troponin T Numeric Delta     Respiratory Panel PCR w/COVID-19(SARS-CoV-2) NAWAF/PHILIPP/ANNETTE/PAD/COR/LAKESHA In-House, NP Swab in [facility], 2 HR TAT - Swab, Nasopharynx    Collection Time: 01/18/25  3:49 AM    Specimen: Nasopharynx; Swab   Result Value Ref Range    ADENOVIRUS, PCR Not Detected Not Detected    Coronavirus 229E Not Detected Not Detected    Coronavirus HKU1 Not Detected Not Detected    Coronavirus NL63 Not Detected Not Detected    Coronavirus OC43 Not Detected Not Detected    COVID19 Not Detected Not Detected - Ref. Range    Human Metapneumovirus Not Detected Not Detected    Human Rhinovirus/Enterovirus Not Detected Not Detected    Influenza A PCR Not Detected Not Detected    Influenza B PCR Not Detected Not Detected     Parainfluenza Virus 1 Not Detected Not Detected    Parainfluenza Virus 2 Not Detected Not Detected    Parainfluenza Virus 3 Not Detected Not Detected    Parainfluenza Virus 4 Not Detected Not Detected    RSV, PCR Not Detected Not Detected    Bordetella pertussis pcr Not Detected Not Detected    Bordetella parapertussis PCR Not Detected Not Detected    Chlamydophila pneumoniae PCR Not Detected Not Detected    Mycoplasma pneumo by PCR Not Detected Not Detected     Medications   sodium chloride 0.9 % flush 10 mL (has no administration in time range)   ketorolac (TORADOL) injection 30 mg (has no administration in time range)   Lidocaine HCl gel (XYLOCAINE) urethral/mucosal syringe ( Topical Given 2/27/25 5205)   docusate sodium (COLACE) capsule 100 mg (100 mg Oral Given 2/27/25 0146)     No results found.    MDM:    Procedures              SHARED VISIT ATTESTATION:    This visit was performed by both myself and an APC.  I performed the substantive portion of the medical decision making. The management plan was made or approved by me, and I take responsibility for patient management.           Ty Vizcarra MD  05:39 EST  02/27/25         Ty Vizcarra MD  02/27/25 0540

## 2025-02-27 NOTE — ED PROVIDER NOTES
Time: 3:05 AM EST  Date of encounter:  2/27/2025  Independent Historian/Clinical History and Information was obtained by:   Patient    History is limited by: N/A    Chief Complaint: Hemorrhoid pain      History of Present Illness:  Patient is a 44 y.o. year old male who presents to the emergency department for evaluation of hemorrhoid that has grown in size over the past few days and is now split open and is bleeding.  Patient states he is unable to sit and it is painful to walk.      Patient Care Team  Primary Care Provider: Orlin Sun MD    Past Medical History:     No Known Allergies  No past medical history on file.  No past surgical history on file.  No family history on file.    Home Medications:  Prior to Admission medications    Medication Sig Start Date End Date Taking? Authorizing Provider   dicyclomine (BENTYL) 20 MG tablet Take 1 tablet by mouth Every 8 (Eight) Hours As Needed for Abdominal Cramping. 1/18/25   Victor Manuel Garcia MD   ondansetron ODT (ZOFRAN-ODT) 4 MG disintegrating tablet Place 1 tablet on the tongue 4 (Four) Times a Day As Needed for Nausea. 1/18/25   Victor Manuel Garcia MD        Social History:          Review of Systems:  Review of Systems   HENT: Negative.     Respiratory: Negative.     Cardiovascular: Negative.    Gastrointestinal:  Positive for rectal pain.   Genitourinary: Negative.         Physical Exam:  /76   Pulse 98   Temp 98.9 °F (37.2 °C)   Resp 18   Wt 98.7 kg (217 lb 9.5 oz)   SpO2 100%     Physical Exam  Vitals and nursing note reviewed.   Constitutional:       General: He is in acute distress.   Pulmonary:      Effort: Pulmonary effort is normal.      Breath sounds: Normal breath sounds.   Abdominal:      General: Abdomen is flat. Bowel sounds are normal.   Genitourinary:     Comments: Hemorrhoid                   Medical Decision Making:      Comorbidities that affect care:    None    External Notes reviewed:    None      The following orders were placed  and all results were independently analyzed by me:  Orders Placed This Encounter   Procedures    Ice pack to rectal area after lidocaine jelly application.  Nursing Communication       Medications Given in the Emergency Department:  Medications   sodium chloride 0.9 % flush 10 mL (has no administration in time range)   docusate sodium (COLACE) capsule 100 mg (has no administration in time range)   ketorolac (TORADOL) injection 30 mg (has no administration in time range)   Lidocaine HCl gel (XYLOCAINE) urethral/mucosal syringe ( Topical Given 2/27/25 0330)        ED Course:         Labs:    Lab Results (last 24 hours)       ** No results found for the last 24 hours. **             Imaging:    No Radiology Exams Resulted Within Past 24 Hours      Differential Diagnosis and Discussion:    Hemorrhoid    PROCEDURES:    Labs were collected in the emergency department and all labs were reviewed and interpreted by me.    No orders to display       Procedures    MDM                     Patient Care Considerations:    CONSULT: I considered consulting general surgery, however after discussing with ER attending MD follow-up outpatient was recommended.      Consultants/Shared Management Plan:        Social Determinants of Health:    Patient is independent, reliable, and has access to care.       Disposition and Care Coordination:    Discharged: The patient is suitable and stable for discharge with no need for consideration of admission.    I have explained discharge medications and the need for follow up with the patient/caretakers. This was also printed in the discharge instructions. Patient was discharged with the following medications and follow up:      Medication List        New Prescriptions      docusate sodium 100 MG capsule  Commonly known as: COLACE  Take 1 capsule by mouth 2 (Two) Times a Day for 5 days.     Hydrocort-Pramoxine (Perianal) 1-1 % rectal foam  Commonly known as: PROCTOFOAM-HS  Insert 1 Application into  the rectum 3 (Three) Times a Day for 3 days.     ibuprofen 800 MG tablet  Commonly known as: ADVIL,MOTRIN  Take 1 tablet by mouth Every 8 (Eight) Hours As Needed for Mild Pain for up to 5 days.     lidocaine 5 % ointment  Commonly known as: XYLOCAINE  Apply 1 Application topically to the appropriate area as directed Every 2 (Two) Hours As Needed for Mild Pain for up to 3 days.               Where to Get Your Medications        These medications were sent to [facility]/pharmacy #[id number] - [place] - [address] - [phone]  - [phone]   [address] [place] [address]      Hours: 24-hours Phone: [phone]   docusate sodium 100 MG capsule  Hydrocort-Pramoxine (Perianal) 1-1 % rectal foam  ibuprofen 800 MG tablet  lidocaine 5 % ointment      Orlin Sun MD  [address] [facility] [address] [phone]    Schedule an appointment as soon as possible for a visit in 1 day         Final diagnoses:   Hemorrhoids, unspecified hemorrhoid type        ED Disposition       ED Disposition   Discharge    Condition   Stable    Comment   --               This medical record created using voice recognition software.             Jane Aparicio, APRN  02/27/25 1816